# Patient Record
Sex: FEMALE | Race: OTHER | Employment: FULL TIME | URBAN - METROPOLITAN AREA
[De-identification: names, ages, dates, MRNs, and addresses within clinical notes are randomized per-mention and may not be internally consistent; named-entity substitution may affect disease eponyms.]

---

## 2018-04-24 ENCOUNTER — APPOINTMENT (OUTPATIENT)
Dept: OTHER | Facility: HOSPITAL | Age: 53
End: 2018-04-24
Attending: ORTHOPAEDIC SURGERY

## 2018-12-28 ENCOUNTER — APPOINTMENT (OUTPATIENT)
Dept: GENERAL RADIOLOGY | Facility: HOSPITAL | Age: 53
DRG: 312 | End: 2018-12-28
Attending: EMERGENCY MEDICINE
Payer: COMMERCIAL

## 2018-12-28 ENCOUNTER — HOSPITAL ENCOUNTER (OUTPATIENT)
Facility: HOSPITAL | Age: 53
Setting detail: OBSERVATION
Discharge: HOME | DRG: 312 | End: 2018-12-29
Attending: EMERGENCY MEDICINE | Admitting: HOSPITALIST
Payer: COMMERCIAL

## 2018-12-28 DIAGNOSIS — R77.8 ELEVATED TROPONIN: ICD-10-CM

## 2018-12-28 DIAGNOSIS — R55 SYNCOPE AND COLLAPSE: Primary | ICD-10-CM

## 2018-12-28 LAB
ANION GAP SERPL CALC-SCNC: 13 MMOL/L (ref 0–18)
BASOPHILS # BLD: 0.1 K/UL (ref 0–0.2)
BASOPHILS NFR BLD: 1 %
BUN SERPL-MCNC: 11 MG/DL (ref 8–20)
BUN/CREAT SERPL: 13.4 (ref 10–20)
CALCIUM SERPL-MCNC: 9.2 MG/DL (ref 8.5–10.5)
CHLORIDE SERPL-SCNC: 103 MMOL/L (ref 95–110)
CHOLEST SERPL-MCNC: 206 MG/DL (ref 110–200)
CO2 SERPL-SCNC: 22 MMOL/L (ref 22–32)
CREAT SERPL-MCNC: 0.82 MG/DL (ref 0.5–1.5)
EOSINOPHIL # BLD: 0 K/UL (ref 0–0.7)
EOSINOPHIL NFR BLD: 0 %
ERYTHROCYTE [DISTWIDTH] IN BLOOD BY AUTOMATED COUNT: 12.7 % (ref 11–15)
GLUCOSE BLDC GLUCOMTR-MCNC: 110 MG/DL (ref 70–99)
GLUCOSE SERPL-MCNC: 113 MG/DL (ref 70–99)
HCT VFR BLD AUTO: 41.1 % (ref 35–48)
HDLC SERPL-MCNC: 48 MG/DL
HGB BLD-MCNC: 13.6 G/DL (ref 12–16)
LDLC SERPL CALC-MCNC: 123 MG/DL (ref 0–99)
LYMPHOCYTES # BLD: 0.8 K/UL (ref 1–4)
LYMPHOCYTES NFR BLD: 7 %
MCH RBC QN AUTO: 28.7 PG (ref 27–32)
MCHC RBC AUTO-ENTMCNC: 33 G/DL (ref 32–37)
MCV RBC AUTO: 86.9 FL (ref 80–100)
MONOCYTES # BLD: 0.5 K/UL (ref 0–1)
MONOCYTES NFR BLD: 4 %
NEUTROPHILS # BLD AUTO: 10 K/UL (ref 1.8–7.7)
NEUTROPHILS NFR BLD: 88 %
NONHDLC SERPL-MCNC: 158 MG/DL
OSMOLALITY UR CALC.SUM OF ELEC: 286 MOSM/KG (ref 275–295)
PLATELET # BLD AUTO: 219 K/UL (ref 140–400)
PMV BLD AUTO: 7.9 FL (ref 7.4–10.3)
POTASSIUM SERPL-SCNC: 3.4 MMOL/L (ref 3.3–5.1)
RBC # BLD AUTO: 4.73 M/UL (ref 3.7–5.4)
SODIUM SERPL-SCNC: 138 MMOL/L (ref 136–144)
TRIGL SERPL-MCNC: 175 MG/DL (ref 1–149)
TROPONIN I SERPL-MCNC: 0.04 NG/ML (ref ?–0.03)
TROPONIN I SERPL-MCNC: 0.06 NG/ML (ref ?–0.03)
WBC # BLD AUTO: 11.4 K/UL (ref 4–11)

## 2018-12-28 PROCEDURE — 3E0337Z INTRODUCTION OF ELECTROLYTIC AND WATER BALANCE SUBSTANCE INTO PERIPHERAL VEIN, PERCUTANEOUS APPROACH: ICD-10-PCS | Performed by: HOSPITALIST

## 2018-12-28 PROCEDURE — 99223 1ST HOSP IP/OBS HIGH 75: CPT | Performed by: HOSPITALIST

## 2018-12-28 PROCEDURE — 71045 X-RAY EXAM CHEST 1 VIEW: CPT | Performed by: EMERGENCY MEDICINE

## 2018-12-28 RX ORDER — MULTIPLE VITAMINS W/ MINERALS TAB 9MG-400MCG
1 TAB ORAL DAILY
Status: DISCONTINUED | OUTPATIENT
Start: 2018-12-29 | End: 2018-12-29

## 2018-12-28 RX ORDER — ENALAPRIL MALEATE 20 MG/1
20 TABLET ORAL DAILY
COMMUNITY

## 2018-12-28 RX ORDER — ONDANSETRON 2 MG/ML
4 INJECTION INTRAMUSCULAR; INTRAVENOUS EVERY 6 HOURS PRN
Status: DISCONTINUED | OUTPATIENT
Start: 2018-12-28 | End: 2018-12-29

## 2018-12-28 RX ORDER — ACETAMINOPHEN 325 MG/1
650 TABLET ORAL EVERY 6 HOURS PRN
Status: DISCONTINUED | OUTPATIENT
Start: 2018-12-28 | End: 2018-12-29

## 2018-12-28 RX ORDER — PANTOPRAZOLE SODIUM 40 MG/1
40 TABLET, DELAYED RELEASE ORAL
Status: DISCONTINUED | OUTPATIENT
Start: 2018-12-29 | End: 2018-12-29

## 2018-12-28 RX ORDER — MULTIVIT-MIN/IRON FUM/FOLIC AC 7.5 MG-4
1 TABLET ORAL DAILY
COMMUNITY

## 2018-12-28 RX ORDER — HYDROCODONE BITARTRATE AND ACETAMINOPHEN 5; 325 MG/1; MG/1
1 TABLET ORAL EVERY 6 HOURS PRN
Status: DISCONTINUED | OUTPATIENT
Start: 2018-12-28 | End: 2018-12-29

## 2018-12-28 RX ORDER — MAGNESIUM HYDROXIDE/ALUMINUM HYDROXICE/SIMETHICONE 120; 1200; 1200 MG/30ML; MG/30ML; MG/30ML
30 SUSPENSION ORAL 4 TIMES DAILY PRN
Status: DISCONTINUED | OUTPATIENT
Start: 2018-12-28 | End: 2018-12-29

## 2018-12-28 RX ORDER — MORPHINE SULFATE 2 MG/ML
2 INJECTION, SOLUTION INTRAMUSCULAR; INTRAVENOUS EVERY 2 HOUR PRN
Status: DISCONTINUED | OUTPATIENT
Start: 2018-12-28 | End: 2018-12-29

## 2018-12-28 RX ORDER — HYDRALAZINE HYDROCHLORIDE 20 MG/ML
10 INJECTION INTRAMUSCULAR; INTRAVENOUS EVERY 4 HOURS PRN
Status: DISCONTINUED | OUTPATIENT
Start: 2018-12-28 | End: 2018-12-29

## 2018-12-28 RX ORDER — MORPHINE SULFATE 4 MG/ML
4 INJECTION, SOLUTION INTRAMUSCULAR; INTRAVENOUS EVERY 2 HOUR PRN
Status: DISCONTINUED | OUTPATIENT
Start: 2018-12-28 | End: 2018-12-29

## 2018-12-28 RX ORDER — HEPARIN SODIUM 5000 [USP'U]/ML
5000 INJECTION, SOLUTION INTRAVENOUS; SUBCUTANEOUS EVERY 12 HOURS
Status: DISCONTINUED | OUTPATIENT
Start: 2018-12-28 | End: 2018-12-29

## 2018-12-28 RX ORDER — ONDANSETRON 2 MG/ML
4 INJECTION INTRAMUSCULAR; INTRAVENOUS EVERY 4 HOURS PRN
Status: DISCONTINUED | OUTPATIENT
Start: 2018-12-28 | End: 2018-12-29

## 2018-12-28 RX ORDER — POTASSIUM CHLORIDE 20 MEQ/1
40 TABLET, EXTENDED RELEASE ORAL EVERY 4 HOURS
Status: COMPLETED | OUTPATIENT
Start: 2018-12-29 | End: 2018-12-29

## 2018-12-28 RX ORDER — ENALAPRIL MALEATE 20 MG/1
20 TABLET ORAL DAILY
Status: DISCONTINUED | OUTPATIENT
Start: 2018-12-29 | End: 2018-12-29

## 2018-12-28 RX ORDER — OMEGA-3-ACID ETHYL ESTERS 1 G/1
1 CAPSULE, LIQUID FILLED ORAL
COMMUNITY

## 2018-12-29 ENCOUNTER — APPOINTMENT (OUTPATIENT)
Dept: CV DIAGNOSTICS | Facility: HOSPITAL | Age: 53
DRG: 312 | End: 2018-12-29
Attending: HOSPITALIST
Payer: COMMERCIAL

## 2018-12-29 ENCOUNTER — APPOINTMENT (OUTPATIENT)
Dept: CT IMAGING | Facility: HOSPITAL | Age: 53
DRG: 312 | End: 2018-12-29
Attending: INTERNAL MEDICINE
Payer: COMMERCIAL

## 2018-12-29 VITALS
OXYGEN SATURATION: 94 % | WEIGHT: 203.5 LBS | SYSTOLIC BLOOD PRESSURE: 136 MMHG | HEART RATE: 88 BPM | DIASTOLIC BLOOD PRESSURE: 64 MMHG | HEIGHT: 64 IN | BODY MASS INDEX: 34.74 KG/M2 | RESPIRATION RATE: 18 BRPM | TEMPERATURE: 98 F

## 2018-12-29 LAB
ALBUMIN SERPL BCP-MCNC: 4.3 G/DL (ref 3.5–4.8)
ALP SERPL-CCNC: 112 U/L (ref 32–100)
ALT SERPL-CCNC: 35 U/L (ref 14–54)
AST SERPL-CCNC: 44 U/L (ref 15–41)
BILIRUB DIRECT SERPL-MCNC: 0.1 MG/DL (ref 0–0.2)
BILIRUB SERPL-MCNC: 0.6 MG/DL (ref 0.3–1.2)
PROT SERPL-MCNC: 6.9 G/DL (ref 5.9–8.4)
TROPONIN I SERPL-MCNC: 0.05 NG/ML (ref ?–0.03)

## 2018-12-29 PROCEDURE — 93306 TTE W/DOPPLER COMPLETE: CPT | Performed by: HOSPITALIST

## 2018-12-29 PROCEDURE — 75574 CT ANGIO HRT W/3D IMAGE: CPT | Performed by: INTERNAL MEDICINE

## 2018-12-29 PROCEDURE — 99239 HOSP IP/OBS DSCHRG MGMT >30: CPT | Performed by: HOSPITALIST

## 2018-12-29 RX ORDER — NITROGLYCERIN 0.4 MG/1
TABLET SUBLINGUAL
Status: DISCONTINUED
Start: 2018-12-29 | End: 2018-12-29

## 2018-12-29 RX ORDER — METOPROLOL TARTRATE 100 MG/1
100 TABLET ORAL ONCE
Status: DISCONTINUED | OUTPATIENT
Start: 2018-12-30 | End: 2018-12-29

## 2018-12-29 RX ORDER — METOPROLOL TARTRATE 50 MG/1
50 TABLET, FILM COATED ORAL ONCE AS NEEDED
Status: COMPLETED | OUTPATIENT
Start: 2018-12-29 | End: 2018-12-29

## 2018-12-29 RX ORDER — DILTIAZEM HYDROCHLORIDE 5 MG/ML
10 INJECTION INTRAVENOUS SEE ADMIN INSTRUCTIONS
Status: ACTIVE | OUTPATIENT
Start: 2018-12-29 | End: 2018-12-29

## 2018-12-29 RX ORDER — METOPROLOL TARTRATE 50 MG/1
50 TABLET, FILM COATED ORAL ONCE AS NEEDED
Status: DISCONTINUED | OUTPATIENT
Start: 2018-12-30 | End: 2018-12-29

## 2018-12-29 RX ORDER — ALPRAZOLAM 0.25 MG/1
0.25 TABLET ORAL ONCE AS NEEDED
Status: DISCONTINUED | OUTPATIENT
Start: 2018-12-30 | End: 2018-12-29

## 2018-12-29 RX ORDER — ATORVASTATIN CALCIUM 40 MG/1
40 TABLET, FILM COATED ORAL NIGHTLY
Qty: 30 TABLET | Refills: 1 | Status: SHIPPED | OUTPATIENT
Start: 2018-12-29

## 2018-12-29 RX ORDER — ATORVASTATIN CALCIUM 40 MG/1
40 TABLET, FILM COATED ORAL NIGHTLY
Status: DISCONTINUED | OUTPATIENT
Start: 2018-12-29 | End: 2018-12-29

## 2018-12-29 RX ORDER — METOPROLOL TARTRATE 100 MG/1
100 TABLET ORAL ONCE AS NEEDED
Status: COMPLETED | OUTPATIENT
Start: 2018-12-29 | End: 2018-12-29

## 2018-12-29 RX ORDER — METOPROLOL TARTRATE 5 MG/5ML
INJECTION INTRAVENOUS
Status: COMPLETED
Start: 2018-12-29 | End: 2018-12-29

## 2018-12-29 RX ORDER — METOPROLOL TARTRATE 50 MG/1
50 TABLET, FILM COATED ORAL ONCE
Status: DISCONTINUED | OUTPATIENT
Start: 2018-12-29 | End: 2018-12-29

## 2018-12-29 RX ORDER — NITROGLYCERIN 0.4 MG/1
0.4 TABLET SUBLINGUAL ONCE
Status: COMPLETED | OUTPATIENT
Start: 2018-12-29 | End: 2018-12-29

## 2018-12-29 RX ORDER — METOPROLOL TARTRATE 100 MG/1
100 TABLET ORAL ONCE
Status: DISCONTINUED | OUTPATIENT
Start: 2018-12-29 | End: 2018-12-29

## 2018-12-29 RX ORDER — METOPROLOL TARTRATE 5 MG/5ML
5 INJECTION INTRAVENOUS SEE ADMIN INSTRUCTIONS
Status: ACTIVE | OUTPATIENT
Start: 2018-12-29 | End: 2018-12-29

## 2018-12-29 RX ORDER — METOPROLOL TARTRATE 100 MG/1
100 TABLET ORAL ONCE AS NEEDED
Status: DISCONTINUED | OUTPATIENT
Start: 2018-12-29 | End: 2018-12-29

## 2018-12-29 RX ORDER — SODIUM CHLORIDE AND POTASSIUM CHLORIDE .9; .15 G/100ML; G/100ML
SOLUTION INTRAVENOUS CONTINUOUS
Status: DISCONTINUED | OUTPATIENT
Start: 2018-12-29 | End: 2018-12-29

## 2018-12-29 RX ORDER — METOPROLOL TARTRATE 50 MG/1
50 TABLET, FILM COATED ORAL ONCE
Status: DISCONTINUED | OUTPATIENT
Start: 2018-12-30 | End: 2018-12-29

## 2018-12-29 RX ORDER — METOPROLOL TARTRATE 50 MG/1
50 TABLET, FILM COATED ORAL ONCE AS NEEDED
Status: DISCONTINUED | OUTPATIENT
Start: 2018-12-29 | End: 2018-12-29

## 2018-12-29 NOTE — IMAGING NOTE
TO CT VIA WHEELCHAIR AT 1145  HX TAKEN PT CONSENTED AT 0902     18 GAUGE IV STARTED AT 1115 POC TESTING COMPLETED GFR > 60    CREATINE = 0.82    CTA ORDERED BY MADHU WAS PT GIVEN CTA  PREMEDS  MG @ 0802    HR 64 /59 METOPROLOL 5 MILLIGRAMS

## 2018-12-29 NOTE — ED NOTES
Pt presents to ED via EMS for a possible seizure. Per medics pt was at work and had a syncope episode with possible seizure activity that was witnessed by coworkers.

## 2018-12-29 NOTE — CONSULTS
Below note from [facility]. Patient was interviewed and examined. Agree with assessment and plan with edits to the document performed as necessary.     [facility] AND [facility]  Report of Consultation    Juliane Fong Patient Status:  Inpatient    / (LOPRESSOR) 5 MG/5ML injection 5 mg, 5 mg, Intravenous, See Admin Instructions **OR** DilTIAZem HCl (CARDIZEM) injection 10 mg, 10 mg, Intravenous, See Admin Instructions **OR** metoprolol Tartrate (LOPRESSOR) 5 MG/5ML injection 5 mg, 5 mg, Intravenous, Se (PROTONIX) EC tab 40 mg, 40 mg, Oral, QAM AC  •  Alum & Mag Hydroxide-Simeth (MAALOX) oral suspension 30 mL, 30 mL, Oral, QID PRN    Review of Systems:  No n/v, no ha, no numbness/weakness, no h/o seizures, has some cough a few weeks ago    FH: No prematur mg/dL 175 Abnormally high     Comment:      Reference Range (NCEP)   Normal              < 150   Borderline High     150-199   High                200-499   Very High           >/= 500   Non HDL Chol <130 mg/dL 158 Abnormally high     Comment:    Adult Ref

## 2018-12-29 NOTE — ED PROVIDER NOTES
Patient Seen in: [facility] AND [facility] Emergency Department    History   Patient presents with:  Syncope (cardiovascular, neurologic)      HPI    The patient presents to the ED after having a syncopal episode at work roughly 1 hour ago.   She states that she No      ROS  Pertinent Positives: Syncope  All other organ systems are reviewed and are negative. Constitutional and vital signs reviewed.       Social History and Family History elements reviewed from today, pertinent positives to the presenting problem I - Abnormal; Notable for the following components:    Troponin 0.06 (*)     All other components within normal limits   TROPONIN I - Abnormal; Notable for the following components:    Troponin 0.05 (*)     All other components within normal limits   POCT Enalapril Maleate (VASOTEC) tab 20 mg (not administered)   multivitamin with minerals (ADULT) tab 1 tablet (not administered)   ondansetron HCl (ZOFRAN) injection 4 mg (not administered)   morphINE sulfate (PF) 2 MG/ML injection 2 mg (not administered) arrhythmias on the monitor but troponin elevated. Initial EKG nonischemic.  2-hour repeat troponin increased from baseline. I feel patient will need admission given possibility for arrhythmia as a cause of her symptoms given elevated troponin in the ED.

## 2018-12-29 NOTE — H&P
[address] Patient Status:  Emergency    1965 MRN [id number]   Location [address] Attending Mary Kay Rodriguez MD   Hosp Day # 0 PCP None Pcp     Date:   oral mucosa is moist.  Head:  Normocephalic, atraumatic. Neck:  Supple, non-tender, no carotid bruit, no jugular venous distention, no lymphadenopathy, no thyromegaly.   Respiratory:  Lungs are clear to auscultation, respirations are non-labored, breath so

## 2018-12-30 NOTE — DISCHARGE SUMMARY
[place] [facility] - [facility]    Discharge Summary    Rosamaria Barthel Patient Status:  Inpatient    1965 MRN [id number]   Location [facility] 3W/SW Attending No att. providers found   [facility] Day # 1 PCP None Pcp     Date of Admission:  ok with EF 65% and no WMAs. CTA coronary angiogram was done and was normal.  Pt was discharged in good condition with f/u with Dr. Niraj Walker.       Hypokalemia  Replaced per protocol.     Hyperlipidemia  Pt started on statin.     Prophylaxis  Subcutaneous h

## 2019-01-02 NOTE — PAYOR COMM NOTE
--------------  ADMISSION REVIEW     Payor: [address] [place] [address] Drive #:  [id number]  Authorization Number: [id number]    Admit date: 12/28/18  Admit time: 2212 DISCHARGED 12/29        Admitting Physician: Doreen Dominguez MD  Attending Physician:  Barb mouth daily. Disp:  Rfl:  12/28/2018 at 1000   Omega-3-acid Ethyl Esters 1 g Oral Cap Take 1 g by mouth every 3 (three) days. Disp:  Rfl:  12/26/2018 at 1000        History reviewed. No pertinent family history.     Smoking Status: Social History    Socioec and affect. Her behavior is normal.   Nursing note and vitals reviewed.       ED Course        Labs Reviewed   BASIC METABOLIC PANEL (8) - Abnormal; Notable for the following components:       Result Value    Glucose 113 (*)     All other components within Normal intervals, nonspecific T wave abnormality, abnormal EKG             Imaging Results Available and Reviewed while in ED: Xr Chest Ap Portable  (cpt=71045)    Result Date: 12/28/2018  CONCLUSION:  1. Suboptimal inspiration limiting the evaluation.  Pro Considerations: Syncope, vasovagal episode, arrhythmia, ACS    Medical Record Review: I personally reviewed available prior medical records for any recent pertinent discharge summaries, testing, and procedures and reviewed those reports.     Complicating Fa Author Type:  Physician    Filed:  12/29/2018  7:48 AM Date of Service:  12/28/2018  9:11 PM Status:  Signed    :  Edmundo Cabot, MD (Physician)         [address] [facility] Patient Status:  Hannah Fallon oriented. Diffuse skin problem:  None. Eye:  Pupils are equal, round and reactive to light, extraocular movements are intact, Normal conjunctiva. HENT:  Normocephalic, oral mucosa is moist.  Head:  Normocephalic, atraumatic.   Neck:  Supple, non-tender, dictate outcome      Nenita Lopez MD  12/28/2018  9:11 PM      Electronically signed by Ming Garduno MD on 12/29/2018  7:48 AM       To Han #[id number]   Admission Info: Inpatient (Adm: 12/28/18)   Hospital Account: [de-identified]   Descr and nausea and fainted soon after.  As per witness patient hit her head in the process of falling, was unconscious for a few minutes before waking up claims events were slightly foggy till she got into the ambulance and was brought into the ER. Liane Schultz denies Tabs            Follow up Visits:         Follow-up Information      Gildardo Messina DO In 1 month.     Specialties:  Cardiovascular Diseases, CARDIOLOGY                         Hospital Discharge Diagnoses: Syncope     Lace+ Score: 25  59-90 High Risk  29-